# Patient Record
Sex: FEMALE | URBAN - METROPOLITAN AREA
[De-identification: names, ages, dates, MRNs, and addresses within clinical notes are randomized per-mention and may not be internally consistent; named-entity substitution may affect disease eponyms.]

---

## 2021-06-30 ENCOUNTER — APPOINTMENT (OUTPATIENT)
Dept: URBAN - METROPOLITAN AREA CLINIC 256 | Age: 21
Setting detail: DERMATOLOGY
End: 2021-07-01

## 2021-06-30 DIAGNOSIS — L50.8 OTHER URTICARIA: ICD-10-CM

## 2021-06-30 PROCEDURE — OTHER MIPS QUALITY: OTHER

## 2021-06-30 PROCEDURE — OTHER PRESCRIPTION: OTHER

## 2021-06-30 PROCEDURE — OTHER COUNSELING: OTHER

## 2021-06-30 PROCEDURE — 99203 OFFICE O/P NEW LOW 30 MIN: CPT

## 2021-06-30 RX ORDER — EPINEPHRINE 0.3 MG/.3ML
INJECTION INTRAMUSCULAR X 1
Qty: 1 | Refills: 3 | Status: ERX | COMMUNITY
Start: 2021-06-30

## 2021-06-30 NOTE — PROCEDURE: COUNSELING
Patient Specific Counseling (Will Not Stick From Patient To Patient): Patient counseled to take Zyrtec 10mg BID for allergic reaction\\n\\nPlan: research  and discuss options for lab testing for allergies. Provider will follow up with patient when more information is available.
Detail Level: Detailed

## 2021-07-01 ENCOUNTER — APPOINTMENT (OUTPATIENT)
Dept: URBAN - METROPOLITAN AREA CLINIC 256 | Age: 21
Setting detail: DERMATOLOGY
End: 2021-07-12

## 2021-07-01 DIAGNOSIS — L50.8 OTHER URTICARIA: ICD-10-CM

## 2021-07-01 PROCEDURE — OTHER ORDER TESTS: OTHER

## 2022-03-07 NOTE — PROGRESS NOTES
Ashlyn is a 21 year old No obstetric history on file. female who presents for annual exam.     Besides routine health maintenance,  she would like to discuss heavy periods and preconception.    HPI: here for very first exam.  Mother in law is with patient.  She states her cycles are 24-26 days last 4-6 days and are very heavy.  She states she can change a maxi pad in 30 minutes.  Her cycles are also getting more painful.  They have not prevented pregnancy for last 2 years and not gotten pregnant.    The patient's PCP is  No primary care provider on file..        GYNECOLOGIC HISTORY:    Patient's last menstrual period was 2022 (exact date).    Regular menses? yes  Menses every 25-28 days.  Length of menses: 6 days    Her current contraception method is: none.  She  reports that she has never smoked. She has never used smokeless tobacco.    Patient is sexually active.  STD testing offered?  Declined  Last PHQ-9 score on record =   PHQ-9 SCORE 3/9/2022   PHQ-9 Total Score 0     Last GAD7 score on record =   CARLOS-7 SCORE 3/9/2022   Total Score 0     Alcohol Score = 1    HEALTH MAINTENANCE:  Cholesterol: (No results found for: CHOL   Last Mammo: Not applicable, Result: Not applicable, Next Mammo: Due at age 39   Pap:   First one today  Colonoscopy:  none, Result: Not applicable, Next Colonoscopy: age 45-50 years old.  Dexa:  none    Health maintenance updated:  no    HISTORY:  OB History    Para Term  AB Living   0 0 0 0 0 0   SAB IAB Ectopic Multiple Live Births   0 0 0 0 0       There is no problem list on file for this patient.    Past Surgical History:   Procedure Laterality Date     NO HISTORY OF SURGERY        Social History     Tobacco Use     Smoking status: Never Smoker     Smokeless tobacco: Never Used   Substance Use Topics     Alcohol use: Not on file           No current outpatient medications on file.     No current facility-administered medications for this visit.     No Known  "Allergies    Past medical, surgical, social and family histories were reviewed and updated in EPIC.    ROS:   12 point review of systems negative other than symptoms noted below or in the HPI.  heavy periods    EXAM:  /62   Ht 1.511 m (4' 11.5\")   Wt 69.6 kg (153 lb 6.4 oz)   LMP 02/27/2022 (Exact Date)   Breastfeeding No   BMI 30.46 kg/m     BMI: Body mass index is 30.46 kg/m .    PHYSICAL EXAM:  Constitutional:   Appearance: Well nourished, well developed, alert, in no acute distress  Neck:  Lymph Nodes:  No lymphadenopathy present    Thyroid:  Gland size normal, nontender, no nodules or masses present  on palpation  Chest:  Respiratory Effort:  Breathing unlabored  Cardiovascular:    Heart: Auscultation:  Regular rate, normal rhythm, no murmurs present  Breasts: Inspection of Breasts:  No lymphadenopathy present., Palpation of Breasts and Axillae:  No masses present on palpation, no breast tenderness., Axillary Lymph Nodes:  No lymphadenopathy present. and No nodularity, asymmetry or nipple discharge bilaterally.  Gastrointestinal:   Abdominal Examination:  Abdomen nontender to palpation, tone normal without rigidity or guarding, no masses present, umbilicus without lesions   Liver and Spleen:  No hepatomegaly present, liver nontender to palpation    Hernias:  No hernias present  Lymphatic: Lymph Nodes:  No other lymphadenopathy present  Skin:  General Inspection:  No rashes present, no lesions present, no areas of  discoloration  Neurologic:    Mental Status:  Oriented X3.  Normal strength and tone, sensory exam                grossly normal, mentation intact and speech normal.    Psychiatric:   Mentation appears normal and affect normal/bright.         Pelvic Exam:  External Genitalia:     Normal appearance for age, no discharge present, no tenderness present, no inflammatory lesions present, color normal  Vagina:     Normal vaginal vault without central or paravaginal defects, no discharge present, " no inflammatory lesions present, no masses present  Bladder:     Nontender to palpation  Urethra:   Urethral Body:  Urethra palpation normal, urethra structural support normal   Urethral Meatus:  No erythema or lesions present  Cervix:     Appearance healthy, no lesions present, nontender to palpation, no bleeding present  Uterus:     Uterus: firm, normal sized and nontender, midplane in position.   Adnexa:     No adnexal tenderness present, no adnexal masses present  Perineum:     Perineum within normal limits, no evidence of trauma, no rashes or skin lesions present  Anus:     Anus within normal limits, no hemorrhoids present  Inguinal Lymph Nodes:     No lymphadenopathy present  Pubic Hair:     Normal pubic hair distribution for age  Genitalia and Groin:     No rashes present, no lesions present, no areas of discoloration, no masses present      COUNSELING:   Reviewed preventive health counseling, as reflected in patient instructions       Regular exercise       Healthy diet/nutrition       Contraception       Family planning    BMI: Body mass index is 30.46 kg/m .  Weight management plan: Discussed healthy diet and exercise guidelines    ASSESSMENT:  21 year old female with satisfactory annual exam.    ICD-10-CM    1. Encounter for gynecological examination without abnormal finding  Z01.419    2. Screening for cervical cancer  Z12.4 Pap thin layer screen only - recommended age 21 - 24 years   3. Menorrhagia with regular cycle  N92.0 US Transvaginal Pelvic Non-OB     TSH       PLAN:  Normal Gyn exam.  Patient to return for pelvic US and see me after for follow up.  Return 1 year for annual.  Pap every 3 years if normal.      FLASH Lainez CNP

## 2022-03-09 ENCOUNTER — OFFICE VISIT (OUTPATIENT)
Dept: OBGYN | Facility: CLINIC | Age: 22
End: 2022-03-09

## 2022-03-09 VITALS
SYSTOLIC BLOOD PRESSURE: 130 MMHG | DIASTOLIC BLOOD PRESSURE: 62 MMHG | WEIGHT: 153.4 LBS | HEIGHT: 60 IN | BODY MASS INDEX: 30.12 KG/M2

## 2022-03-09 DIAGNOSIS — Z12.4 SCREENING FOR CERVICAL CANCER: ICD-10-CM

## 2022-03-09 DIAGNOSIS — N92.0 MENORRHAGIA WITH REGULAR CYCLE: ICD-10-CM

## 2022-03-09 DIAGNOSIS — Z01.419 ENCOUNTER FOR GYNECOLOGICAL EXAMINATION WITHOUT ABNORMAL FINDING: Primary | ICD-10-CM

## 2022-03-09 PROCEDURE — 99213 OFFICE O/P EST LOW 20 MIN: CPT | Mod: 25 | Performed by: NURSE PRACTITIONER

## 2022-03-09 PROCEDURE — 36415 COLL VENOUS BLD VENIPUNCTURE: CPT | Performed by: NURSE PRACTITIONER

## 2022-03-09 PROCEDURE — 84443 ASSAY THYROID STIM HORMONE: CPT | Performed by: NURSE PRACTITIONER

## 2022-03-09 PROCEDURE — G0145 SCR C/V CYTO,THINLAYER,RESCR: HCPCS | Performed by: NURSE PRACTITIONER

## 2022-03-09 PROCEDURE — 99385 PREV VISIT NEW AGE 18-39: CPT | Performed by: NURSE PRACTITIONER

## 2022-03-09 ASSESSMENT — ANXIETY QUESTIONNAIRES
7. FEELING AFRAID AS IF SOMETHING AWFUL MIGHT HAPPEN: NOT AT ALL
1. FEELING NERVOUS, ANXIOUS, OR ON EDGE: NOT AT ALL
6. BECOMING EASILY ANNOYED OR IRRITABLE: NOT AT ALL
5. BEING SO RESTLESS THAT IT IS HARD TO SIT STILL: NOT AT ALL
2. NOT BEING ABLE TO STOP OR CONTROL WORRYING: NOT AT ALL
IF YOU CHECKED OFF ANY PROBLEMS ON THIS QUESTIONNAIRE, HOW DIFFICULT HAVE THESE PROBLEMS MADE IT FOR YOU TO DO YOUR WORK, TAKE CARE OF THINGS AT HOME, OR GET ALONG WITH OTHER PEOPLE: NOT DIFFICULT AT ALL
3. WORRYING TOO MUCH ABOUT DIFFERENT THINGS: NOT AT ALL
GAD7 TOTAL SCORE: 0

## 2022-03-09 ASSESSMENT — PATIENT HEALTH QUESTIONNAIRE - PHQ9
SUM OF ALL RESPONSES TO PHQ QUESTIONS 1-9: 0
5. POOR APPETITE OR OVEREATING: NOT AT ALL

## 2022-03-10 LAB — TSH SERPL DL<=0.005 MIU/L-ACNC: 1.3 MU/L (ref 0.4–4)

## 2022-03-10 ASSESSMENT — ANXIETY QUESTIONNAIRES: GAD7 TOTAL SCORE: 0

## 2022-03-11 LAB
BKR LAB AP GYN ADEQUACY: NORMAL
BKR LAB AP GYN INTERPRETATION: NORMAL
BKR LAB AP HPV REFLEX: NO
BKR LAB AP PREVIOUS ABNORMAL: NORMAL
PATH REPORT.COMMENTS IMP SPEC: NORMAL
PATH REPORT.COMMENTS IMP SPEC: NORMAL
PATH REPORT.RELEVANT HX SPEC: NORMAL

## 2022-03-29 NOTE — PROGRESS NOTES
"    SUBJECTIVE:                                                   Ashlyn Quick is a 21 year old female who presents to clinic today for the following health issue(s):  Patient presents with:  Follow Up: US results          HPI:Patient here with  for follow up pelvic US for heavy cycles, not able to get pregnant.        Patient's last menstrual period was 2022 (exact date)..     Patient is sexually active, .  Using none for contraception.    reports that she has never smoked. She has never used smokeless tobacco.    STD testing offered?  Declined    Health maintenance updated:  yes    Today's PHQ-2 Score: No flowsheet data found.  Today's PHQ-9 Score:   PHQ-9 SCORE 3/9/2022   PHQ-9 Total Score 0     Today's CARLOS-7 Score:   CARLOS-7 SCORE 3/9/2022   Total Score 0       Problem list and histories reviewed & adjusted, as indicated.  Additional history: as documented.    There is no problem list on file for this patient.    Past Surgical History:   Procedure Laterality Date     NO HISTORY OF SURGERY        Social History     Tobacco Use     Smoking status: Never Smoker     Smokeless tobacco: Never Used   Substance Use Topics     Alcohol use: Not on file      Problem (# of Occurrences) Relation (Name,Age of Onset)    No Known Problems (8) Mother, Father, Sister, Brother, Maternal Grandmother, Maternal Grandfather, Paternal Grandmother, Other            No current outpatient medications on file.     No current facility-administered medications for this visit.     No Known Allergies    ROS:  12 point review of systems negative other than symptoms noted below or in the HPI.  Normal menstrual cycles      OBJECTIVE:     BP (!) 140/62   Ht 1.511 m (4' 11.5\")   Wt 71.6 kg (157 lb 12.8 oz)   LMP 2022 (Exact Date)   Breastfeeding No   BMI 31.34 kg/m    Body mass index is 31.34 kg/m .    Exam:  Constitutional:  Appearance: Well nourished, well developed alert, in no acute distress  Neurologic:  " Mental Status:  Oriented X3.  Normal strength and tone, sensory exam grossly normal, mentation intact and speech normal.    Psychiatric:  Mentation appears normal and affect normal/bright.     In-Clinic Test Results:  Results for orders placed or performed in visit on 03/30/22 (from the past 24 hour(s))   US Transvaginal Pelvic Non-OB    Narrative    US Transvaginal Pelvic Non-OB  Order #: [id number] Accession #: [id number]      Study Notes       Spurling, Brittnee on 3/30/2022  1:28 PM      Gynecological Ultrasound Report  Pelvic U/S - Transvaginal  [facility]  Referring Provider: Mariann Nicole CNP   Sonographer:  Brittnee Spurling, RDMS  Indication: Bleeding/Menses- Menorrhagia (heavy menses)  LMP: No LMP recorded.     Gynecological Ultrasonography:   Uterus: anteverted. Contour is irregular w/ myomata: 1 Left Intramural   Anterior  0.7 x 0.6 x 0.7 cm.  Size: 8.1 x 5.6 x 4.2 cm  Endometrium: Thickness Total 2.7 mm  Right Ovary: 2.7 x 3.2 x 1.8 cm. Wnl  Left Ovary: 2.6 x 1.5 x 1.7 cm. Wnl  Cul de Sac Free Fluid: No free fluid     Impression:         Uterus with small anterior intramural fibroid, otherwise normal pelvic US.    Jeannine Swain MD         ASSESSMENT/PLAN:                                                        ICD-10-CM    1. Uterine leiomyoma, unspecified location  D25.9          Patient to see Dr. Andrade for possible removal of uterine fibroid.     FLASH Lainez CNP  [facility] [place]

## 2022-03-30 ENCOUNTER — ANCILLARY PROCEDURE (OUTPATIENT)
Dept: ULTRASOUND IMAGING | Facility: CLINIC | Age: 22
End: 2022-03-30
Attending: NURSE PRACTITIONER

## 2022-03-30 ENCOUNTER — OFFICE VISIT (OUTPATIENT)
Dept: OBGYN | Facility: CLINIC | Age: 22
End: 2022-03-30
Attending: NURSE PRACTITIONER

## 2022-03-30 VITALS
BODY MASS INDEX: 30.98 KG/M2 | SYSTOLIC BLOOD PRESSURE: 140 MMHG | WEIGHT: 157.8 LBS | DIASTOLIC BLOOD PRESSURE: 62 MMHG | HEIGHT: 60 IN

## 2022-03-30 DIAGNOSIS — N92.0 MENORRHAGIA WITH REGULAR CYCLE: ICD-10-CM

## 2022-03-30 DIAGNOSIS — D25.9 UTERINE LEIOMYOMA, UNSPECIFIED LOCATION: Primary | ICD-10-CM

## 2022-03-30 PROCEDURE — 99212 OFFICE O/P EST SF 10 MIN: CPT | Performed by: NURSE PRACTITIONER

## 2022-03-30 PROCEDURE — 76830 TRANSVAGINAL US NON-OB: CPT | Performed by: OBSTETRICS & GYNECOLOGY

## 2022-04-04 ENCOUNTER — TELEPHONE (OUTPATIENT)
Dept: OBGYN | Facility: CLINIC | Age: 22
End: 2022-04-04

## 2022-04-04 DIAGNOSIS — E61.1 LOW IRON: Primary | ICD-10-CM

## 2022-04-04 NOTE — TELEPHONE ENCOUNTER
Patient recently saw Daisha. She is calling today to ask Daisha to call her mother in law-Enedeliarisa Andrea at [phone] to go over pt's treatment plan. Enedelia is on the Privacy to Communicate

## 2022-04-07 NOTE — TELEPHONE ENCOUNTER
Talked with mother in law  Regarding Patient has a uterine fibroid.  She is scheduled for a consult with Dr. Andrade for possible removal and not being able to get pregnant.  NIC CamposC